# Patient Record
Sex: FEMALE | Race: ASIAN | NOT HISPANIC OR LATINO | Employment: FULL TIME | URBAN - METROPOLITAN AREA
[De-identification: names, ages, dates, MRNs, and addresses within clinical notes are randomized per-mention and may not be internally consistent; named-entity substitution may affect disease eponyms.]

---
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
## 2019-07-06 ENCOUNTER — HOSPITAL ENCOUNTER (EMERGENCY)
Facility: MEDICAL CENTER | Age: 19
End: 2019-07-06
Attending: EMERGENCY MEDICINE
Payer: COMMERCIAL

## 2019-07-06 VITALS
HEIGHT: 59 IN | WEIGHT: 90.39 LBS | HEART RATE: 98 BPM | DIASTOLIC BLOOD PRESSURE: 83 MMHG | BODY MASS INDEX: 18.22 KG/M2 | TEMPERATURE: 99 F | SYSTOLIC BLOOD PRESSURE: 118 MMHG | RESPIRATION RATE: 18 BRPM | OXYGEN SATURATION: 100 %

## 2019-07-06 DIAGNOSIS — F29 PSYCHOSIS, UNSPECIFIED PSYCHOSIS TYPE (HCC): ICD-10-CM

## 2019-07-06 LAB
AMPHETAMINES UR QL SCN: NEGATIVE
BARBITURATES UR QL SCN: NEGATIVE
BENZODIAZ UR QL SCN: NEGATIVE
COCAINE UR QL SCN: NEGATIVE
HCG UR QL: NEGATIVE
OPIATES UR QL SCN: NEGATIVE
PCP UR QL SCN: NEGATIVE
POC BREATHALIZER: 0.01 PERCENT (ref 0–0.01)
SP GR UR REFRACTOMETRY: 1.02
THC UR QL SCN: NEGATIVE

## 2019-07-06 PROCEDURE — 81025 URINE PREGNANCY TEST: CPT

## 2019-07-06 PROCEDURE — 80305 DRUG TEST PRSMV DIR OPT OBS: CPT

## 2019-07-06 PROCEDURE — 93005 ELECTROCARDIOGRAM TRACING: CPT | Performed by: EMERGENCY MEDICINE

## 2019-07-06 PROCEDURE — 302970 POC BREATHALIZER: Performed by: EMERGENCY MEDICINE

## 2019-07-06 PROCEDURE — 99285 EMERGENCY DEPT VISIT HI MDM: CPT

## 2019-07-06 RX ORDER — CHLORAL HYDRATE 500 MG
1000 CAPSULE ORAL DAILY
Status: SHIPPED | COMMUNITY
End: 2021-09-10

## 2019-07-06 RX ORDER — ASCORBIC ACID 500 MG
1500 TABLET ORAL DAILY
Status: SHIPPED | COMMUNITY
End: 2021-09-10

## 2019-07-06 RX ORDER — NORGESTIMATE AND ETHINYL ESTRADIOL 7DAYSX3 28
KIT ORAL
Status: SHIPPED | COMMUNITY
Start: 2017-05-11 | End: 2019-07-06

## 2019-07-06 RX ORDER — QUETIAPINE FUMARATE 25 MG/1
25 TABLET, FILM COATED ORAL
Status: SHIPPED | COMMUNITY
End: 2021-09-10

## 2019-07-06 NOTE — ED TRIAGE NOTES
"Chief Complaint   Patient presents with   • ALOC     pt BIB by mom c/o pt not slept in 3 days. Hearing voices that she is going to die, detached from reality. pt on seroquel currently. pt saw psychologist last year for schizophrenia. Pt in triage talking to herself softly and nodding her head. no hx of SI per mom.  pt taken to room 3.      /81   Pulse 79   Temp 37.6 °C (99.6 °F) (Temporal)   Resp 16   Ht 1.499 m (4' 11\")   Wt 41 kg (90 lb 6.2 oz)   LMP  (LMP Unknown)   SpO2 98%   BMI 18.26 kg/m²     "

## 2019-07-06 NOTE — ED NOTES
Med Rec completed per patient's mom  Allergies reviewed  No ORAL antibiotics in last 14 days    Patients mom states that last night patient took Seroquel 50 mg instead of 25 mg, but this was just a one time thing

## 2019-07-06 NOTE — ED NOTES
"Pt's mother left to purchase dinner for pt. Pt declining hospital snacks. Pt calmly laying in gurney, continues to mumble to self stating \" I told you she would, I told you. She's the one\".   "

## 2019-07-06 NOTE — DISCHARGE PLANNING
Alert Team  Received notification at 3 pm of consult order for this pt.  Pt is second on list for evaluations; will be around 5pm.  AT will call [facility] and set up Telemed appointment for eval.
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
## 2019-07-06 NOTE — ED NOTES
Pt sitting up in gurney, continues to nod head and quietly speak. Pt's mother left, father now at bedside.

## 2019-07-07 NOTE — ED NOTES
[facility] [place]. Awaiting transfer to [place]. Sitter outside room.

## 2019-07-07 NOTE — CONSULTS
"[facility]   TRIAGE ASSESSMENT done via TELEMED to [facility] ER    Name: Kate Aparicio  MRN: [id number]  : 2000  Age: 19 y.o.  Date of assessment: 2019  PCP: Ghada Olivas M.D.  Persons in attendance: Patient, Biological Mother and Biological Father    CHIEF COMPLAINT/PRESENTING ISSUE (as stated by parents): Pt was brought in by her parents d/t increasing psychosis.  Pt herself was unable to stop responding to internal stimuli (RIS) long enough to answer any of my questions.  I was able to get her attention by calling her name, but she was unable to concentrate long enough to even give me her birth date.    Pt floridly psychotic; responding frequently to internal stimuli the entire time I spoke with her parents.  Parents report they took first noticed symptoms 2018.  Pt had used juana and cocaine, then developed symptoms in the following weeks.  She started going to therapy, and while first thought to be drug induced psychosis, the therapist eventually recommended they seek psychiatric consult, as she feared pt may have schizoaffective d/o.   Per parents, the pt refused to f/u with psychiatry and was \"managing ok for a while.\"  But then she started having noticeable symptoms again, progressing to where she is today.  They report she had a \"major breakdown at work\" on , about a month ago, as well as \"four times in hysterics\" since then.  They report she hasn't slept in 3 days and is almost continuously talking to herself at home.   Chief Complaint   Patient presents with   • ALOC     pt BIB by mom c/o pt not slept in 3 days. Hearing voices that she is going to die, detached from reality. pt on seroquel currently. pt saw psychologist last year for schizophrenia. Pt in triage talking to herself softly and nodding her head. no hx of SI per mom.  pt taken to room 3.         CURRENT LIVING SITUATION/SOCIAL SUPPORT: She lives at home with parents, who are very " supportive.    BEHAVIORAL HEALTH TREATMENT HISTORY  Does patient/parent report a history of prior behavioral health treatment for patient?   Yes:    Dates Level of Care Facilty/Provider Diagnosis/Problem Medications   2018 outpt therapy                                                                      Parents report she has had no other psych tx other than therapy last year.    SAFETY ASSESSMENT - SELF  Does patient acknowledge current or past symptoms of dangerousness to self? no  Does parent/significant other report patient has current or past symptoms of dangerousness to self? no  Does presenting problem suggest symptoms of dangerousness to self? No    SAFETY ASSESSMENT - OTHERS  Does patient acknowledge current or past symptoms of aggressive behavior or risk to others? no  Does parent/significant other report patient has current or past symptoms of aggressive behavior or risk to others?  no  Does presenting problem suggest symptoms of dangerousness to others? No    Crisis Safety Plan completed and copy given to patient? N\A    ABUSE/NEGLECT SCREENING  Does patient report feeling “unsafe” in his/her home, or afraid of anyone?  FRANCESCA (unable to assess)  Does patient report any history of physical, sexual, or emotional abuse?  No, per parents  Does parent or significant other report any of the above? no  Is there evidence of neglect by self?  no  Is there evidence of neglect by a caregiver? no  Does the patient/parent report any history of CPS/APS/police involvement related to suspected abuse/neglect or domestic violence? no  Based on the information provided during the current assessment, is a mandated report of suspected abuse/neglect being made?  No    SUBSTANCE USE SCREENING  Yes:  Darius all substances used in the past 30 days:      Last Use Amount   []   Alcohol     []   Marijuana     []   Heroin     []   Prescription Opioids  (used without prescription, for    recreation, or in excess of prescribed amount)    "  []   Other Prescription  (used without prescription, for    recreation, or in excess of prescribed amount)     []   Cocaine      []   Methamphetamine     []   \"\" drugs (ectasy, MDMA)     []   Other substances        UDS results: negative  Breathalyzer results: 0.0    What consequences does the patient associate with any of the above substance use and or addictive behaviors? None    Risk factors for detox (check all that apply):  []  Seizures   []  Diaphoretic (sweating)   []  Tremors   []  Hallucinations   []  Increased blood pressure   []  Decreased blood pressure   []  Other   [x]  None      [] Patient education on risk factors for detoxification and instructed to return to ER as needed.      MENTAL STATUS   Participation: Limited verbal participation  Grooming: Casual  Orientation: Evidence of hallucinations present  Behavior: Tense  Eye contact: Poor  Mood: Anxious  Affect: Constricted  Thought process: froilan  Thought content: froilan  Speech: Pressured  Perception: Evidence of auditory hallucination. When asked if she hears voices, she                                                                                        said Yes then No, but she is obviously RIS.  Memory:  froilan  Insight: Poor  Judgment:  Poor  Other:    Collateral information: past visits, parents provided the majority of info in this eval d/t acuity of pt's symptoms.  Source:  [x] Significant other present in person:   [] Significant other by telephone  [] [facility]   [] [facility] Nursing Staff  [x] [facility]  [] Other:     [x] Unable to complete full assessment due to: assessment is complete in terms of knowing she needs inpt treatment/legal hold.   More information should be gathered from her if/when she is able to effectively communicate.  [] Acute intoxication  [] Patient declined to participate/engage  [] Patient verbally unresponsive  [] Significant cognitive deficits  [x] Significant perceptual distortions or " behavioral disorganization  [] Other:      CLINICAL IMPRESSIONS:  Primary:  Psychosis  Secondary:  Unable to care for self       IDENTIFIED NEEDS/PLAN:  [Trigger DISPOSITION list for any items marked]    []  Imminent safety risk - self [] Imminent safety risk - others   []  Acute substance withdrawal [x]  Psychosis/Impaired reality testing   []  Mood/anxiety []  Substance use/Addictive behavior   []  Maladaptive behaviro []  Parent/child conflict   []  Family/Couples conflict []  Biomedical   []  Housing []  Financial   []   Legal  Occupational/Educational   []  Domestic violence []  Other:     Disposition: Actively being addressed by Legal Hold and [facility]    Does patient express agreement with the above plan? no    Referral appointment(s) scheduled? N\A    Alert team only: Pt to be placed on legal hold for inability to care for self d/t psychosis.    I have requested that, if bedside staff at [facility] concurs with the appropriateness, that parents be allowed to stay at the bedside, despite the legal hold.  I have also asked the bedside staff call pt's mother Ibeth, ([phone],) if pt is transferred and they aren't there.  I have discussed findings and recommendations with Dr. Schaefer, who is in agreement with these recommendations.     Referral information sent to the following community providers : all    If applicable : Referred  to  at [facility] for legal hold.   Legal hold paperwork to be completed and sent for transfer by [facility] staff.      Nanci England R.N.  7/6/2019

## 2019-07-07 NOTE — ED NOTES
"Tele psych complete. Pt's parents at her side, pt remains agitated, mumbling to self in short phrases about voices she is hearing. Pt's mother tearful, tissues and emotional support provided. Pt's parents informed they may go home and rest if they wish but declined stating they \" wont be able to rest anyway\" and wish to stay with daughter.   "

## 2019-07-07 NOTE — ED NOTES
Report given to NOLBERTO. Repeat vitals taken upon transfer to [place]. VSS. Pt calm and cooperative.

## 2019-07-07 NOTE — ED NOTES
Pt very distracted but able to sign consent form with encouragement from parents. Telepsych set up at bedside.

## 2019-07-07 NOTE — ED NOTES
Pt resting calmly in gurney, unnecessary equipment removed from room. Family in room, sitter providing direct observation at doorway.

## 2019-07-14 LAB — EKG IMPRESSION: NORMAL

## 2019-09-05 ENCOUNTER — HOSPITAL ENCOUNTER (EMERGENCY)
Facility: MEDICAL CENTER | Age: 19
End: 2019-09-05
Attending: EMERGENCY MEDICINE
Payer: COMMERCIAL

## 2019-09-05 VITALS
HEART RATE: 89 BPM | WEIGHT: 98.33 LBS | RESPIRATION RATE: 18 BRPM | BODY MASS INDEX: 19.3 KG/M2 | TEMPERATURE: 98.4 F | HEIGHT: 60 IN | OXYGEN SATURATION: 99 % | SYSTOLIC BLOOD PRESSURE: 106 MMHG | DIASTOLIC BLOOD PRESSURE: 66 MMHG

## 2019-09-05 DIAGNOSIS — R25.1 TREMOR: ICD-10-CM

## 2019-09-05 LAB — EKG IMPRESSION: NORMAL

## 2019-09-05 PROCEDURE — 99283 EMERGENCY DEPT VISIT LOW MDM: CPT

## 2019-09-05 PROCEDURE — 93005 ELECTROCARDIOGRAM TRACING: CPT | Performed by: EMERGENCY MEDICINE

## 2019-09-05 RX ORDER — ZIPRASIDONE HYDROCHLORIDE 80 MG/1
80 CAPSULE ORAL DAILY
Status: SHIPPED | COMMUNITY
End: 2021-09-10

## 2019-09-05 RX ORDER — LORAZEPAM 1 MG/1
1.5 TABLET ORAL DAILY
COMMUNITY

## 2019-09-06 NOTE — ED TRIAGE NOTES
Chief Complaint   Patient presents with   • Tremors     Pt reports that she is newly diagnosed schizophrenic, just started geodon and ativan. States that today she has developed a new tremor. Sent to  for EKG,  referred pt here.   /79   Pulse 99   Temp 36.9 °C (98.4 °F) (Temporal)   Resp 20   Ht 1.524 m (5')   Wt 44.6 kg (98 lb 5.2 oz)   SpO2 96%   Pt informed of wait times. Educated on triage process.  Asked to return to triage RN for any new or worsening of symptoms. Thanked for patience.

## 2019-09-06 NOTE — ED PROVIDER NOTES
ED Provider Note    CHIEF COMPLAINT  Chief Complaint   Patient presents with   • Tremors       [facility]  Kate Aparicio is a 19 y.o. female who presents for evaluation of tremors.  The patient was recently diagnosed as having schizophrenia.  She was started on Geodon approximately 3 weeks ago.  A week ago her dose was increased and she is now developed tremors.  She saw her psychiatrist today and they decreased the dose of Geodon but she was referred to urgent care for an EKG.  Urgent care refused to see her and she is sent to the emergency department for evaluation.  She is had no fevers.  She is had no cough or sputum production.  She is had no chest pain.  She denies vomiting or diarrhea.  She states generally she is feeling much better after being started on Geodon and Ativan.  Over the past 24 hours she has had some mild intermittent shortness of breath.  She has none at this time.    REVIEW OF SYSTEMS  See [facility] for further details. All other systems negative.    PAST MEDICAL HISTORY  Past Medical History:   Diagnosis Date   • Schizophrenia ([facility])        FAMILY HISTORY  History reviewed. No pertinent family history.    SOCIAL HISTORY  Social History     Socioeconomic History   • Marital status: Single     Spouse name: Not on file   • Number of children: Not on file   • Years of education: Not on file   • Highest education level: Not on file   Occupational History   • Not on file   Social Needs   • Financial resource strain: Not on file   • Food insecurity:     Worry: Not on file     Inability: Not on file   • Transportation needs:     Medical: Not on file     Non-medical: Not on file   Tobacco Use   • Smoking status: Never Smoker   • Smokeless tobacco: Never Used   Substance and Sexual Activity   • Alcohol use: Yes   • Drug use: Never   • Sexual activity: Not on file   Lifestyle   • Physical activity:     Days per week: Not on file     Minutes per session: Not on file   • Stress: Not on file   Relationships   •  Social connections:     Talks on phone: Not on file     Gets together: Not on file     Attends [employer] service: Not on file     Active member of club or organization: Not on file     Attends meetings of clubs or organizations: Not on file     Relationship status: Not on file   • Intimate partner violence:     Fear of current or ex partner: Not on file     Emotionally abused: Not on file     Physically abused: Not on file     Forced sexual activity: Not on file   Other Topics Concern   • Not on file   Social History Narrative   • Not on file       SURGICAL HISTORY  History reviewed. No pertinent surgical history.    CURRENT MEDICATIONS  Home Medications     Reviewed by Eunice Riley R.N. (Registered Nurse) on 09/05/19 at 1716  Med List Status: Partial   Medication Last Dose Status   ascorbic acid (ASCORBIC ACID) 500 MG Tab  Active   B Complex Vitamins (VITAMIN B COMPLEX PO)  Active   CALCIUM PO  Active   LORazepam (ATIVAN) 1 MG Tab  Active   Omega-3 Fatty Acids (FISH OIL) 1000 MG Cap capsule  Active   QUEtiapine (SEROQUEL) 25 MG Tab  Active   vitamin D (CHOLECALCIFEROL) 1000 UNIT Tab  Active   ziprasidone (GEODON) 80 MG Cap  Active                ALLERGIES  No Known Allergies    PHYSICAL EXAM  VITAL SIGNS: /79   Pulse 99   Temp 36.9 °C (98.4 °F) (Temporal)   Resp 20   Ht 1.524 m (5')   Wt 44.6 kg (98 lb 5.2 oz)   SpO2 96%   BMI 19.20 kg/m²   Constitutional: Well developed, Well nourished, No acute distress, Non-toxic appearance.   HENT: Normocephalic, Atraumatic.  Eyes:  EOMI, Conjunctiva normal, No discharge.   Cardiovascular: Normal heart rate, Normal rhythm, No murmurs, No rubs, No gallops.   Thorax & Lungs: Clear to auscultation without wheezes, rales, or rhonchi.   Skin: Warm, Dry.  Extremities: No edema, no calf tenderness.  Musculoskeletal: Good range of motion in all major joints.   Neurologic: Awake and alert.  The patient does have a fine resting tremor in both hands.    EKG  EKG  Interpretation    Interpreted by emergency department physician    Rhythm: normal sinus   Rate: normal  Axis: normal  Ectopy: none  Conduction: normal  ST Segments: normal  T Waves: normal  Q Waves: none    Clinical Impression: no acute changes          COURSE & MEDICAL DECISION MAKING  Pertinent Labs & Imaging studies reviewed. (See chart for details)  This is a 19-year-old here for evaluation of tremors.  She was recently started on Geodon for a new diagnosis of schizophrenia.  She saw her psychiatrist today and they decreased her dose but requested that she get evaluated for her QTC.  EKG here shows normal sinus rhythm with a normal QTC.  I discussed the results of the EKG with the patient and her family.  She does have a mild tremor but she does not want any medications at this time.  She will decrease her Geodon dose as instructed by her psychiatrist.  She does have Ativan which I think will help with her tremors until her levels come down.  She will follow-up with her psychiatrist.    FINAL IMPRESSION  1.  Resting tremor secondary to Geodon  2.   3.         Electronically signed by: Sanchez Luis, 9/5/2019 6:03 PM

## 2019-09-06 NOTE — ED NOTES
Assumed poc for d/c. Pt and mom received d/c instr; pt and mom verbalized understanding. Pt amb to lobby w mom

## 2020-05-06 ENCOUNTER — APPOINTMENT (OUTPATIENT)
Dept: RADIOLOGY | Facility: MEDICAL CENTER | Age: 20
End: 2020-05-06
Attending: PSYCHIATRY & NEUROLOGY
Payer: COMMERCIAL

## 2020-12-22 ENCOUNTER — HOSPITAL ENCOUNTER (OUTPATIENT)
Dept: LAB | Facility: MEDICAL CENTER | Age: 20
End: 2020-12-22
Attending: PSYCHIATRY & NEUROLOGY
Payer: COMMERCIAL

## 2020-12-22 LAB
25(OH)D3 SERPL-MCNC: 26 NG/ML (ref 30–100)
ALBUMIN SERPL BCP-MCNC: 3.7 G/DL (ref 3.2–4.9)
ALBUMIN/GLOB SERPL: 1.2 G/DL
ALP SERPL-CCNC: 83 U/L (ref 30–99)
ALT SERPL-CCNC: 6 U/L (ref 2–50)
ANION GAP SERPL CALC-SCNC: 10 MMOL/L (ref 7–16)
AST SERPL-CCNC: 14 U/L (ref 12–45)
BASOPHILS # BLD AUTO: 0.4 % (ref 0–1.8)
BASOPHILS # BLD: 0.03 K/UL (ref 0–0.12)
BILIRUB SERPL-MCNC: <0.2 MG/DL (ref 0.1–1.5)
BUN SERPL-MCNC: 9 MG/DL (ref 8–22)
CALCIUM SERPL-MCNC: 9.1 MG/DL (ref 8.5–10.5)
CHLORIDE SERPL-SCNC: 103 MMOL/L (ref 96–112)
CHOLEST SERPL-MCNC: 156 MG/DL (ref 100–199)
CO2 SERPL-SCNC: 24 MMOL/L (ref 20–33)
CREAT SERPL-MCNC: 0.62 MG/DL (ref 0.5–1.4)
EOSINOPHIL # BLD AUTO: 0.17 K/UL (ref 0–0.51)
EOSINOPHIL NFR BLD: 2 % (ref 0–6.9)
ERYTHROCYTE [DISTWIDTH] IN BLOOD BY AUTOMATED COUNT: 40.8 FL (ref 35.9–50)
EST. AVERAGE GLUCOSE BLD GHB EST-MCNC: 105 MG/DL
FASTING STATUS PATIENT QL REPORTED: NORMAL
GLOBULIN SER CALC-MCNC: 3.1 G/DL (ref 1.9–3.5)
GLUCOSE SERPL-MCNC: 74 MG/DL (ref 65–99)
HBA1C MFR BLD: 5.3 % (ref 0–5.6)
HCT VFR BLD AUTO: 39.8 % (ref 37–47)
HDLC SERPL-MCNC: 60 MG/DL
HGB BLD-MCNC: 12.8 G/DL (ref 12–16)
IMM GRANULOCYTES # BLD AUTO: 0.02 K/UL (ref 0–0.11)
IMM GRANULOCYTES NFR BLD AUTO: 0.2 % (ref 0–0.9)
LDLC SERPL CALC-MCNC: 70 MG/DL
LYMPHOCYTES # BLD AUTO: 3.72 K/UL (ref 1–4.8)
LYMPHOCYTES NFR BLD: 44.3 % (ref 22–41)
MCH RBC QN AUTO: 29.6 PG (ref 27–33)
MCHC RBC AUTO-ENTMCNC: 32.2 G/DL (ref 33.6–35)
MCV RBC AUTO: 92.1 FL (ref 81.4–97.8)
MONOCYTES # BLD AUTO: 0.38 K/UL (ref 0–0.85)
MONOCYTES NFR BLD AUTO: 4.5 % (ref 0–13.4)
NEUTROPHILS # BLD AUTO: 4.08 K/UL (ref 2–7.15)
NEUTROPHILS NFR BLD: 48.6 % (ref 44–72)
NRBC # BLD AUTO: 0 K/UL
NRBC BLD-RTO: 0 /100 WBC
PLATELET # BLD AUTO: 362 K/UL (ref 164–446)
PMV BLD AUTO: 9.6 FL (ref 9–12.9)
POTASSIUM SERPL-SCNC: 4.3 MMOL/L (ref 3.6–5.5)
PROT SERPL-MCNC: 6.8 G/DL (ref 6–8.2)
RBC # BLD AUTO: 4.32 M/UL (ref 4.2–5.4)
SODIUM SERPL-SCNC: 137 MMOL/L (ref 135–145)
T4 FREE SERPL-MCNC: 0.96 NG/DL (ref 0.93–1.7)
TRIGL SERPL-MCNC: 130 MG/DL (ref 0–149)
TSH SERPL DL<=0.005 MIU/L-ACNC: 1.17 UIU/ML (ref 0.38–5.33)
WBC # BLD AUTO: 8.4 K/UL (ref 4.8–10.8)

## 2020-12-22 PROCEDURE — 80307 DRUG TEST PRSMV CHEM ANLYZR: CPT

## 2020-12-22 PROCEDURE — 36415 COLL VENOUS BLD VENIPUNCTURE: CPT

## 2020-12-22 PROCEDURE — 80053 COMPREHEN METABOLIC PANEL: CPT

## 2020-12-22 PROCEDURE — 83036 HEMOGLOBIN GLYCOSYLATED A1C: CPT

## 2020-12-22 PROCEDURE — 84439 ASSAY OF FREE THYROXINE: CPT

## 2020-12-22 PROCEDURE — 85025 COMPLETE CBC W/AUTO DIFF WBC: CPT

## 2020-12-22 PROCEDURE — 82306 VITAMIN D 25 HYDROXY: CPT

## 2020-12-22 PROCEDURE — 80061 LIPID PANEL: CPT

## 2020-12-22 PROCEDURE — 84443 ASSAY THYROID STIM HORMONE: CPT

## 2020-12-24 LAB
AMPHET CTO UR CFM-MCNC: NEGATIVE NG/ML
BARBITURATES CTO UR CFM-MCNC: NEGATIVE NG/ML
BENZODIAZ CTO UR CFM-MCNC: NEGATIVE NG/ML
CANNABINOIDS CTO UR CFM-MCNC: NEGATIVE NG/ML
COCAINE CTO UR CFM-MCNC: NEGATIVE NG/ML
DRUG COMMENT 753798: NORMAL
METHADONE CTO UR CFM-MCNC: NEGATIVE NG/ML
OPIATES CTO UR CFM-MCNC: NEGATIVE NG/ML
PCP CTO UR CFM-MCNC: NEGATIVE NG/ML
PROPOXYPH CTO UR CFM-MCNC: NEGATIVE NG/ML

## 2021-08-31 ENCOUNTER — OFFICE VISIT (OUTPATIENT)
Dept: URGENT CARE | Facility: PHYSICIAN GROUP | Age: 21
End: 2021-08-31
Payer: COMMERCIAL

## 2021-08-31 VITALS
RESPIRATION RATE: 16 BRPM | DIASTOLIC BLOOD PRESSURE: 68 MMHG | HEIGHT: 60 IN | SYSTOLIC BLOOD PRESSURE: 110 MMHG | WEIGHT: 153.6 LBS | BODY MASS INDEX: 30.15 KG/M2 | HEART RATE: 96 BPM | OXYGEN SATURATION: 97 % | TEMPERATURE: 98.4 F

## 2021-08-31 DIAGNOSIS — N30.01 ACUTE CYSTITIS WITH HEMATURIA: ICD-10-CM

## 2021-08-31 DIAGNOSIS — R30.0 DYSURIA: ICD-10-CM

## 2021-08-31 LAB
APPEARANCE UR: NORMAL
BILIRUB UR STRIP-MCNC: NORMAL MG/DL
COLOR UR AUTO: NORMAL
GLUCOSE UR STRIP.AUTO-MCNC: NORMAL MG/DL
INT CON NEG: NORMAL
INT CON POS: NORMAL
KETONES UR STRIP.AUTO-MCNC: NORMAL MG/DL
LEUKOCYTE ESTERASE UR QL STRIP.AUTO: NORMAL
NITRITE UR QL STRIP.AUTO: NORMAL
PH UR STRIP.AUTO: 6 [PH] (ref 5–8)
POC URINE PREGNANCY TEST: NEGATIVE
PROT UR QL STRIP: 100 MG/DL
RBC UR QL AUTO: NORMAL
SP GR UR STRIP.AUTO: 1.03
UROBILINOGEN UR STRIP-MCNC: 0.2 MG/DL

## 2021-08-31 PROCEDURE — 99203 OFFICE O/P NEW LOW 30 MIN: CPT | Performed by: NURSE PRACTITIONER

## 2021-08-31 PROCEDURE — 81025 URINE PREGNANCY TEST: CPT | Performed by: NURSE PRACTITIONER

## 2021-08-31 PROCEDURE — 81002 URINALYSIS NONAUTO W/O SCOPE: CPT | Performed by: NURSE PRACTITIONER

## 2021-08-31 RX ORDER — BENZTROPINE MESYLATE 1 MG/1
1 TABLET ORAL
COMMUNITY
Start: 2021-07-14

## 2021-08-31 RX ORDER — OLANZAPINE 5 MG/1
TABLET ORAL
COMMUNITY
Start: 2021-08-17

## 2021-08-31 RX ORDER — NORGESTIMATE AND ETHINYL ESTRADIOL 7DAYSX3 LO
1 KIT ORAL
COMMUNITY
Start: 2021-07-16

## 2021-08-31 RX ORDER — CEFDINIR 300 MG/1
300 CAPSULE ORAL EVERY 12 HOURS
Qty: 10 CAPSULE | Refills: 0 | Status: SHIPPED | OUTPATIENT
Start: 2021-08-31 | End: 2021-09-05

## 2021-08-31 ASSESSMENT — ENCOUNTER SYMPTOMS
BACK PAIN: 0
VOMITING: 0
DIARRHEA: 0
CHILLS: 0
HEADACHES: 0
WEAKNESS: 0
ABDOMINAL PAIN: 0
CONSTIPATION: 0
FEVER: 0
DIZZINESS: 0
NAUSEA: 0
MYALGIAS: 0

## 2021-08-31 ASSESSMENT — FIBROSIS 4 INDEX: FIB4 SCORE: 0.35

## 2021-08-31 NOTE — PROGRESS NOTES
"Inez Aparicio is a 21 y.o. female who presents with Urinary Frequency (painful urination, burning, x1 week )            HPI  States dysuria, urgency, frequency x 1 week. Denies hematuria. LMP 8/9/21. \"normal\". Sexually active, uses birth control./ Denies fever, nausea/vomiting, mild low pelvic pressure, no back pain, no vaginal discharge.     PMH:  has a past medical history of Schizophrenia ([facility]).  MEDS:   Current Outpatient Medications:   •  benztropine (COGENTIN) 1 MG Tab, Take 1 mg by mouth every day., Disp: , Rfl:   •  OLANZapine (ZYPREXA) 5 MG Tab, TAKE 1 TABLET (5 MG) BY MOUTH EVERY NIGHT, Disp: , Rfl:   •  LORazepam (ATIVAN) 1 MG Tab, Take 1.5 mg by mouth every day., Disp: , Rfl:   •  TRI-LO-ESTARYLLA 0.18/0.215/0.25 MG-25 MCG Tab, Take 1 Tablet by mouth every day. (Patient not taking: Reported on 8/31/2021), Disp: , Rfl:   •  ziprasidone (GEODON) 80 MG Cap, Take 80 mg by mouth every day. (Patient not taking: Reported on 8/31/2021), Disp: , Rfl:   •  QUEtiapine (SEROQUEL) 25 MG Tab, Take 25 mg by mouth every bedtime. (Patient not taking: Reported on 8/31/2021), Disp: , Rfl:   •  Omega-3 Fatty Acids (FISH OIL) 1000 MG Cap capsule, Take 1,000 mg by mouth every day. (Patient not taking: Reported on 8/31/2021), Disp: , Rfl:   •  CALCIUM PO, Take 1 Tab by mouth every day. (Patient not taking: Reported on 8/31/2021), Disp: , Rfl:   •  ascorbic acid (ASCORBIC ACID) 500 MG Tab, Take 1,500 mg by mouth every day. (Patient not taking: Reported on 8/31/2021), Disp: , Rfl:   •  vitamin D (CHOLECALCIFEROL) 1000 UNIT Tab, Take 1,000 Units by mouth every day. (Patient not taking: Reported on 8/31/2021), Disp: , Rfl:   •  B Complex Vitamins (VITAMIN B COMPLEX PO), Take 1 Tab by mouth every day. (Patient not taking: Reported on 8/31/2021), Disp: , Rfl:   ALLERGIES: No Known Allergies  SURGHX: History reviewed. No pertinent surgical history.  SOCHX:  reports that she has never smoked. She has never used " smokeless tobacco. She reports current alcohol use. She reports that she does not use drugs.  FH: Family history was reviewed, no pertinent findings to report    Review of Systems   Constitutional: Negative for chills, fever and malaise/fatigue.   Gastrointestinal: Negative for abdominal pain, constipation, diarrhea, nausea and vomiting.   Genitourinary: Positive for dysuria, frequency and urgency.   Musculoskeletal: Negative for back pain and myalgias.   Skin: Negative for itching and rash.   Neurological: Negative for dizziness, weakness and headaches.   Endo/Heme/Allergies: Negative for environmental allergies.   All other systems reviewed and are negative.             Objective     /68 (BP Location: Right arm, Patient Position: Sitting, BP Cuff Size: Large adult)   Pulse 96   Temp 36.9 °C (98.4 °F) (Temporal)   Resp 16   Ht 1.524 m (5')   Wt 69.7 kg (153 lb 9.6 oz)   SpO2 97%   BMI 30.00 kg/m²      Physical Exam  Vitals reviewed.   Constitutional:       General: She is awake. She is not in acute distress.     Appearance: Normal appearance. She is well-developed. She is not ill-appearing, toxic-appearing or diaphoretic.   HENT:      Head: Normocephalic.   Eyes:      Conjunctiva/sclera: Conjunctivae normal.      Pupils: Pupils are equal, round, and reactive to light.   Cardiovascular:      Rate and Rhythm: Normal rate.   Pulmonary:      Effort: Pulmonary effort is normal.   Abdominal:      General: Bowel sounds are normal. There is no distension.      Palpations: Abdomen is soft.      Tenderness: There is no abdominal tenderness. There is no right CVA tenderness, left CVA tenderness, guarding or rebound.   Musculoskeletal:         General: Normal range of motion.      Cervical back: Normal range of motion and neck supple.   Skin:     General: Skin is warm and dry.   Neurological:      Mental Status: She is alert and oriented to person, place, and time.   Psychiatric:         Behavior: Behavior is  cooperative.                             Assessment & Plan        1. Acute cystitis with hematuria    - cefdinir (OMNICEF) 300 MG Cap; Take 1 Capsule by mouth every 12 hours for 5 days.  Dispense: 10 Capsule; Refill: 0    2. Dysuria    - POCT Urinalysis  - POCT PREGNANCY     -Increase water intake  -Urinate more frequently and empty bladder completely  -Practice good toileting hygiene after bowel movements and sexual intercourse, refrain from sexual intercourse until infection cleared  -Monitor for back/flank pain, difficulty urinating, blood in urine- need re-evaluation

## 2021-09-10 ENCOUNTER — OFFICE VISIT (OUTPATIENT)
Dept: URGENT CARE | Facility: PHYSICIAN GROUP | Age: 21
End: 2021-09-10
Payer: COMMERCIAL

## 2021-09-10 ENCOUNTER — HOSPITAL ENCOUNTER (OUTPATIENT)
Facility: MEDICAL CENTER | Age: 21
End: 2021-09-10
Attending: PHYSICIAN ASSISTANT
Payer: COMMERCIAL

## 2021-09-10 VITALS
HEART RATE: 86 BPM | RESPIRATION RATE: 16 BRPM | WEIGHT: 152 LBS | TEMPERATURE: 97.6 F | OXYGEN SATURATION: 97 % | BODY MASS INDEX: 29.84 KG/M2 | SYSTOLIC BLOOD PRESSURE: 104 MMHG | DIASTOLIC BLOOD PRESSURE: 70 MMHG | HEIGHT: 60 IN

## 2021-09-10 DIAGNOSIS — N76.0 ACUTE VAGINITIS: ICD-10-CM

## 2021-09-10 DIAGNOSIS — R21 RASH OF GENITAL AREA: ICD-10-CM

## 2021-09-10 PROCEDURE — 87510 GARDNER VAG DNA DIR PROBE: CPT

## 2021-09-10 PROCEDURE — 87480 CANDIDA DNA DIR PROBE: CPT

## 2021-09-10 PROCEDURE — 87660 TRICHOMONAS VAGIN DIR PROBE: CPT

## 2021-09-10 PROCEDURE — 99213 OFFICE O/P EST LOW 20 MIN: CPT | Performed by: PHYSICIAN ASSISTANT

## 2021-09-10 RX ORDER — FLUCONAZOLE 150 MG/1
TABLET ORAL
Qty: 2 TABLET | Refills: 0 | Status: SHIPPED | OUTPATIENT
Start: 2021-09-10

## 2021-09-10 RX ORDER — KETOCONAZOLE 20 MG/G
CREAM TOPICAL
Qty: 30 G | Refills: 1 | Status: SHIPPED | OUTPATIENT
Start: 2021-09-10

## 2021-09-10 ASSESSMENT — ENCOUNTER SYMPTOMS
NAUSEA: 0
FLANK PAIN: 0
FEVER: 0
COUGH: 0
ABDOMINAL PAIN: 0
VOMITING: 0
CHILLS: 0

## 2021-09-10 ASSESSMENT — FIBROSIS 4 INDEX: FIB4 SCORE: 0.35

## 2021-09-10 NOTE — PROGRESS NOTES
Subjective:   Kate Aparicio  is a 21 y.o. female who presents for Rash (pt was taking antibiotics for UTI after the antibiotics she noticed that she start developing a rash between her legs,x5 days.)      Rash  This is a new problem. The current episode started in the past 7 days. Pertinent negatives include no cough, fever or vomiting.     Patient presents urgent care out of concern for yeast infection.  She states she did finish a urinary antibiotic around 5 days ago.  She was warned at that time she may develop to yeast infection thereafter.  She denies dysuria frequency urgency or hematuria.  She notes complete resolution of symptoms of urinary tract infection.  She denies chance of pregnancy stating states she is currently on her menstrual period.  This is normal timing for her menstrual period.  She denies any abnormal discharge.  She denies concern for STI.  She states she has a slight pruritic rash in her groin crease and on either side of vagina.  She has tried no treatments thus far.    Review of Systems   Constitutional: Negative for chills and fever.   Respiratory: Negative for cough.    Gastrointestinal: Negative for abdominal pain, nausea and vomiting.   Genitourinary: Negative for dysuria, flank pain, frequency, hematuria and urgency.   Skin: Positive for itching and rash.       No Known Allergies     Objective:   /70 (BP Location: Right arm, Patient Position: Sitting, BP Cuff Size: Adult)   Pulse 86   Temp 36.4 °C (97.6 °F) (Temporal)   Resp 16   Ht 1.524 m (5')   Wt 68.9 kg (152 lb)   SpO2 97%   BMI 29.69 kg/m²     Physical Exam  Vitals and nursing note reviewed.   Constitutional:       General: She is not in acute distress.     Appearance: She is well-developed. She is not diaphoretic.   HENT:      Head: Normocephalic and atraumatic.      Right Ear: External ear normal.      Left Ear: External ear normal.      Nose: Nose normal.   Eyes:      General: Lids are normal. No scleral  icterus.        Right eye: No discharge.         Left eye: No discharge.      Conjunctiva/sclera: Conjunctivae normal.   Pulmonary:      Effort: Pulmonary effort is normal. No respiratory distress.   Genitourinary:     Comments: Patient opts to decline pelvic/genital exam for self swab  Musculoskeletal:         General: Normal range of motion.      Cervical back: Neck supple.   Skin:     General: Skin is warm and dry.      Coloration: Skin is not pale.      Findings: No erythema.   Neurological:      Mental Status: She is alert and oriented to person, place, and time. She is not disoriented.   Psychiatric:         Speech: Speech normal.         Behavior: Behavior normal.       Vaginal pathogens swab pending    Assessment/Plan:   1. Acute vaginitis  - VAGINAL PATHOGENS DNA PANEL; Future  - fluconazole (DIFLUCAN) 150 MG tablet; Take one tab PO day #1, wait 4 days, if still w/ s/sx take 2nd tab PO  Dispense: 2 Tablet; Refill: 0    2. Rash of genital area  - ketoconazole (NIZORAL) 2 % Cream; Apply to affected area twice daily; continue treatment for approximately 2 weeks  Dispense: 30 g; Refill: 1  -Sent with topical antifungal, continue for approximately 2-week and treatment  -Covered with Diflucan x2 doses, will check vaginal pathogens  -Encouraged to sign up for my chart for results  -Return to clinic with lack of resolution or progression of symptoms.       I have worn an N95 mask, gloves and eye protection for the entire encounter with this patient.     Differential diagnosis, natural history, supportive care, and indications for immediate follow-up discussed.

## 2021-09-11 DIAGNOSIS — N76.0 ACUTE VAGINITIS: ICD-10-CM

## 2021-09-11 LAB
CANDIDA DNA VAG QL PROBE+SIG AMP: NEGATIVE
G VAGINALIS DNA VAG QL PROBE+SIG AMP: POSITIVE
T VAGINALIS DNA VAG QL PROBE+SIG AMP: NEGATIVE

## 2021-09-14 ENCOUNTER — TELEPHONE (OUTPATIENT)
Dept: URGENT CARE | Facility: PHYSICIAN GROUP | Age: 21
End: 2021-09-14

## 2021-09-14 DIAGNOSIS — B96.89 BV (BACTERIAL VAGINOSIS): ICD-10-CM

## 2021-09-14 DIAGNOSIS — N76.0 BV (BACTERIAL VAGINOSIS): ICD-10-CM

## 2021-09-14 RX ORDER — METRONIDAZOLE 500 MG/1
500 TABLET ORAL 2 TIMES DAILY
Qty: 14 TABLET | Refills: 0 | Status: SHIPPED | OUTPATIENT
Start: 2021-09-14 | End: 2021-09-21

## 2021-09-14 NOTE — PROGRESS NOTES
1. BV (bacterial vaginosis)    - metroNIDAZOLE (FLAGYL) 500 MG Tab; Take 1 Tablet by mouth 2 times a day for 7 days.  Dispense: 14 Tablet; Refill: 0

## 2021-09-15 NOTE — TELEPHONE ENCOUNTER
Pt called because she got a voicemail her results were in.  I gave the noted results from Sacha on the labs.

## 2022-01-25 ENCOUNTER — HOSPITAL ENCOUNTER (OUTPATIENT)
Dept: LAB | Facility: MEDICAL CENTER | Age: 22
End: 2022-01-25
Attending: PSYCHIATRY & NEUROLOGY
Payer: COMMERCIAL

## 2022-01-25 LAB
25(OH)D3 SERPL-MCNC: 24 NG/ML (ref 30–100)
ALBUMIN SERPL BCP-MCNC: 3.8 G/DL (ref 3.2–4.9)
ALBUMIN/GLOB SERPL: 1.2 G/DL
ALP SERPL-CCNC: 83 U/L (ref 30–99)
ALT SERPL-CCNC: 10 U/L (ref 2–50)
ANION GAP SERPL CALC-SCNC: 12 MMOL/L (ref 7–16)
AST SERPL-CCNC: 18 U/L (ref 12–45)
BASOPHILS # BLD AUTO: 0.3 % (ref 0–1.8)
BASOPHILS # BLD: 0.02 K/UL (ref 0–0.12)
BILIRUB SERPL-MCNC: 0.2 MG/DL (ref 0.1–1.5)
BUN SERPL-MCNC: 10 MG/DL (ref 8–22)
CALCIUM SERPL-MCNC: 8.9 MG/DL (ref 8.5–10.5)
CHLORIDE SERPL-SCNC: 105 MMOL/L (ref 96–112)
CHOLEST SERPL-MCNC: 161 MG/DL (ref 100–199)
CO2 SERPL-SCNC: 21 MMOL/L (ref 20–33)
CREAT SERPL-MCNC: 0.75 MG/DL (ref 0.5–1.4)
EOSINOPHIL # BLD AUTO: 0.08 K/UL (ref 0–0.51)
EOSINOPHIL NFR BLD: 1 % (ref 0–6.9)
ERYTHROCYTE [DISTWIDTH] IN BLOOD BY AUTOMATED COUNT: 41.9 FL (ref 35.9–50)
EST. AVERAGE GLUCOSE BLD GHB EST-MCNC: 105 MG/DL
FASTING STATUS PATIENT QL REPORTED: NORMAL
GLOBULIN SER CALC-MCNC: 3.2 G/DL (ref 1.9–3.5)
GLUCOSE SERPL-MCNC: 79 MG/DL (ref 65–99)
HBA1C MFR BLD: 5.3 % (ref 4–5.6)
HCT VFR BLD AUTO: 38.5 % (ref 37–47)
HDLC SERPL-MCNC: 56 MG/DL
HGB BLD-MCNC: 12.4 G/DL (ref 12–16)
IMM GRANULOCYTES # BLD AUTO: 0.02 K/UL (ref 0–0.11)
IMM GRANULOCYTES NFR BLD AUTO: 0.3 % (ref 0–0.9)
LDLC SERPL CALC-MCNC: 82 MG/DL
LYMPHOCYTES # BLD AUTO: 4.2 K/UL (ref 1–4.8)
LYMPHOCYTES NFR BLD: 53.4 % (ref 22–41)
MCH RBC QN AUTO: 29.7 PG (ref 27–33)
MCHC RBC AUTO-ENTMCNC: 32.2 G/DL (ref 33.6–35)
MCV RBC AUTO: 92.3 FL (ref 81.4–97.8)
MONOCYTES # BLD AUTO: 0.38 K/UL (ref 0–0.85)
MONOCYTES NFR BLD AUTO: 4.8 % (ref 0–13.4)
NEUTROPHILS # BLD AUTO: 3.17 K/UL (ref 2–7.15)
NEUTROPHILS NFR BLD: 40.2 % (ref 44–72)
NRBC # BLD AUTO: 0 K/UL
NRBC BLD-RTO: 0 /100 WBC
PLATELET # BLD AUTO: 351 K/UL (ref 164–446)
PMV BLD AUTO: 10 FL (ref 9–12.9)
POTASSIUM SERPL-SCNC: 4 MMOL/L (ref 3.6–5.5)
PROT SERPL-MCNC: 7 G/DL (ref 6–8.2)
RBC # BLD AUTO: 4.17 M/UL (ref 4.2–5.4)
SODIUM SERPL-SCNC: 138 MMOL/L (ref 135–145)
T4 FREE SERPL-MCNC: 1.11 NG/DL (ref 0.93–1.7)
TRIGL SERPL-MCNC: 114 MG/DL (ref 0–149)
TSH SERPL DL<=0.005 MIU/L-ACNC: 0.66 UIU/ML (ref 0.38–5.33)
WBC # BLD AUTO: 7.9 K/UL (ref 4.8–10.8)

## 2022-01-25 PROCEDURE — 80053 COMPREHEN METABOLIC PANEL: CPT

## 2022-01-25 PROCEDURE — 84443 ASSAY THYROID STIM HORMONE: CPT

## 2022-01-25 PROCEDURE — 82306 VITAMIN D 25 HYDROXY: CPT

## 2022-01-25 PROCEDURE — 84439 ASSAY OF FREE THYROXINE: CPT

## 2022-01-25 PROCEDURE — 85025 COMPLETE CBC W/AUTO DIFF WBC: CPT

## 2022-01-25 PROCEDURE — 83036 HEMOGLOBIN GLYCOSYLATED A1C: CPT

## 2022-01-25 PROCEDURE — 80307 DRUG TEST PRSMV CHEM ANLYZR: CPT

## 2022-01-25 PROCEDURE — 80061 LIPID PANEL: CPT

## 2022-01-25 PROCEDURE — 36415 COLL VENOUS BLD VENIPUNCTURE: CPT

## 2022-01-27 LAB
AMPHETAMINES SERPL QL SCN: NEGATIVE NG/ML
ANNOTATION COMMENT IMP: NORMAL
BARBITURATES SERPL QL SCN: NEGATIVE NG/ML
BENZODIAZ SERPL QL SCN: NEGATIVE NG/ML
BUPRENORPHINE SERPL-MCNC: NEGATIVE NG/ML
CANNABINOIDS SERPL QL SCN: NEGATIVE NG/ML
COCAINE SERPL QL SCN: NEGATIVE NG/ML
METHADONE SERPL QL SCN: NEGATIVE NG/ML
METHAMPHET SERPL QL: NEGATIVE NG/ML
OPIATES SERPL QL SCN: NEGATIVE NG/ML
OXYCODONE SERPL QL: NEGATIVE NG/ML
PCP SERPL QL SCN: NEGATIVE NG/ML

## 2024-12-10 ENCOUNTER — HOSPITAL ENCOUNTER (OUTPATIENT)
Dept: LAB | Facility: MEDICAL CENTER | Age: 24
End: 2024-12-10
Attending: PSYCHIATRY & NEUROLOGY
Payer: COMMERCIAL

## 2024-12-10 LAB
BASOPHILS # BLD AUTO: 0.3 % (ref 0–1.8)
BASOPHILS # BLD: 0.02 K/UL (ref 0–0.12)
EOSINOPHIL # BLD AUTO: 0.06 K/UL (ref 0–0.51)
EOSINOPHIL NFR BLD: 0.9 % (ref 0–6.9)
ERYTHROCYTE [DISTWIDTH] IN BLOOD BY AUTOMATED COUNT: 41.5 FL (ref 35.9–50)
EST. AVERAGE GLUCOSE BLD GHB EST-MCNC: 120 MG/DL
HBA1C MFR BLD: 5.8 % (ref 4–5.6)
HCT VFR BLD AUTO: 40.8 % (ref 37–47)
HGB BLD-MCNC: 13.4 G/DL (ref 12–16)
IMM GRANULOCYTES # BLD AUTO: 0.02 K/UL (ref 0–0.11)
IMM GRANULOCYTES NFR BLD AUTO: 0.3 % (ref 0–0.9)
LYMPHOCYTES # BLD AUTO: 2.7 K/UL (ref 1–4.8)
LYMPHOCYTES NFR BLD: 38.4 % (ref 22–41)
MCH RBC QN AUTO: 29.3 PG (ref 27–33)
MCHC RBC AUTO-ENTMCNC: 32.8 G/DL (ref 32.2–35.5)
MCV RBC AUTO: 89.3 FL (ref 81.4–97.8)
MONOCYTES # BLD AUTO: 0.41 K/UL (ref 0–0.85)
MONOCYTES NFR BLD AUTO: 5.8 % (ref 0–13.4)
NEUTROPHILS # BLD AUTO: 3.83 K/UL (ref 1.82–7.42)
NEUTROPHILS NFR BLD: 54.3 % (ref 44–72)
NRBC # BLD AUTO: 0 K/UL
NRBC BLD-RTO: 0 /100 WBC (ref 0–0.2)
PLATELET # BLD AUTO: 345 K/UL (ref 164–446)
PMV BLD AUTO: 10.3 FL (ref 9–12.9)
RBC # BLD AUTO: 4.57 M/UL (ref 4.2–5.4)
WBC # BLD AUTO: 7 K/UL (ref 4.8–10.8)

## 2024-12-10 PROCEDURE — 84443 ASSAY THYROID STIM HORMONE: CPT

## 2024-12-10 PROCEDURE — 80061 LIPID PANEL: CPT

## 2024-12-10 PROCEDURE — 84439 ASSAY OF FREE THYROXINE: CPT

## 2024-12-10 PROCEDURE — 84402 ASSAY OF FREE TESTOSTERONE: CPT

## 2024-12-10 PROCEDURE — 82306 VITAMIN D 25 HYDROXY: CPT

## 2024-12-10 PROCEDURE — 80053 COMPREHEN METABOLIC PANEL: CPT

## 2024-12-10 PROCEDURE — 83036 HEMOGLOBIN GLYCOSYLATED A1C: CPT

## 2024-12-10 PROCEDURE — 84481 FREE ASSAY (FT-3): CPT

## 2024-12-10 PROCEDURE — 82746 ASSAY OF FOLIC ACID SERUM: CPT

## 2024-12-10 PROCEDURE — 82607 VITAMIN B-12: CPT

## 2024-12-10 PROCEDURE — 86376 MICROSOMAL ANTIBODY EACH: CPT

## 2024-12-10 PROCEDURE — 85025 COMPLETE CBC W/AUTO DIFF WBC: CPT

## 2024-12-10 PROCEDURE — 36415 COLL VENOUS BLD VENIPUNCTURE: CPT

## 2024-12-10 PROCEDURE — 84270 ASSAY OF SEX HORMONE GLOBUL: CPT

## 2024-12-10 PROCEDURE — 86800 THYROGLOBULIN ANTIBODY: CPT

## 2024-12-10 PROCEDURE — 84403 ASSAY OF TOTAL TESTOSTERONE: CPT

## 2024-12-11 LAB
25(OH)D3 SERPL-MCNC: 51 NG/ML (ref 30–100)
ALBUMIN SERPL BCP-MCNC: 4.7 G/DL (ref 3.2–4.9)
ALBUMIN/GLOB SERPL: 1.5 G/DL
ALP SERPL-CCNC: 72 U/L (ref 30–99)
ALT SERPL-CCNC: 14 U/L (ref 2–50)
ANION GAP SERPL CALC-SCNC: 14 MMOL/L (ref 7–16)
AST SERPL-CCNC: 15 U/L (ref 12–45)
BILIRUB SERPL-MCNC: 0.4 MG/DL (ref 0.1–1.5)
BUN SERPL-MCNC: 9 MG/DL (ref 8–22)
CALCIUM ALBUM COR SERPL-MCNC: 8.9 MG/DL (ref 8.5–10.5)
CALCIUM SERPL-MCNC: 9.5 MG/DL (ref 8.5–10.5)
CHLORIDE SERPL-SCNC: 100 MMOL/L (ref 96–112)
CHOLEST SERPL-MCNC: 168 MG/DL (ref 100–199)
CO2 SERPL-SCNC: 24 MMOL/L (ref 20–33)
CREAT SERPL-MCNC: 0.62 MG/DL (ref 0.5–1.4)
FASTING STATUS PATIENT QL REPORTED: NORMAL
FOLATE SERPL-MCNC: 7 NG/ML
GFR SERPLBLD CREATININE-BSD FMLA CKD-EPI: 126 ML/MIN/1.73 M 2
GLOBULIN SER CALC-MCNC: 3.1 G/DL (ref 1.9–3.5)
GLUCOSE SERPL-MCNC: 80 MG/DL (ref 65–99)
HDLC SERPL-MCNC: 64 MG/DL
LDLC SERPL CALC-MCNC: 92 MG/DL
POTASSIUM SERPL-SCNC: 3.8 MMOL/L (ref 3.6–5.5)
PROT SERPL-MCNC: 7.8 G/DL (ref 6–8.2)
SODIUM SERPL-SCNC: 138 MMOL/L (ref 135–145)
T3FREE SERPL-MCNC: 3.18 PG/ML (ref 2–4.4)
T4 FREE SERPL-MCNC: 1.32 NG/DL (ref 0.93–1.7)
THYROPEROXIDASE AB SERPL-ACNC: 14 IU/ML (ref 0–9)
TRIGL SERPL-MCNC: 58 MG/DL (ref 0–149)
TSH SERPL-ACNC: 0.46 UIU/ML (ref 0.35–5.5)
VIT B12 SERPL-MCNC: 1408 PG/ML (ref 211–911)

## 2024-12-12 LAB — THYROGLOB AB SERPL-ACNC: <0.9 IU/ML (ref 0–4)

## 2024-12-15 LAB
SHBG SERPL-SCNC: 49 NMOL/L (ref 25–122)
TESTOST FREE SERPL-MCNC: 6.6 PG/ML (ref 0.8–7.4)
TESTOST SERPL-MCNC: 50 NG/DL (ref 9–55)

## 2025-04-28 ENCOUNTER — APPOINTMENT (OUTPATIENT)
Dept: MEDICAL GROUP | Facility: MEDICAL CENTER | Age: 25
End: 2025-04-28
Payer: COMMERCIAL

## 2025-05-28 ENCOUNTER — APPOINTMENT (OUTPATIENT)
Dept: MEDICAL GROUP | Facility: PHYSICIAN GROUP | Age: 25
End: 2025-05-28
Payer: COMMERCIAL

## 2025-06-20 ENCOUNTER — HOSPITAL ENCOUNTER (OUTPATIENT)
Dept: LAB | Facility: MEDICAL CENTER | Age: 25
End: 2025-06-20
Attending: PSYCHIATRY & NEUROLOGY
Payer: COMMERCIAL

## 2025-06-20 LAB
ALBUMIN SERPL BCP-MCNC: 4.2 G/DL (ref 3.2–4.9)
ALBUMIN/GLOB SERPL: 1.4 G/DL
ALP SERPL-CCNC: 75 U/L (ref 30–99)
ALT SERPL-CCNC: 9 U/L (ref 2–50)
ANION GAP SERPL CALC-SCNC: 12 MMOL/L (ref 7–16)
AST SERPL-CCNC: 17 U/L (ref 12–45)
BASOPHILS # BLD AUTO: 0.4 % (ref 0–1.8)
BASOPHILS # BLD: 0.03 K/UL (ref 0–0.12)
BILIRUB SERPL-MCNC: 0.4 MG/DL (ref 0.1–1.5)
BUN SERPL-MCNC: 8 MG/DL (ref 8–22)
CALCIUM ALBUM COR SERPL-MCNC: 9.1 MG/DL (ref 8.5–10.5)
CALCIUM SERPL-MCNC: 9.3 MG/DL (ref 8.5–10.5)
CHLORIDE SERPL-SCNC: 103 MMOL/L (ref 96–112)
CO2 SERPL-SCNC: 23 MMOL/L (ref 20–33)
CREAT SERPL-MCNC: 0.68 MG/DL (ref 0.5–1.4)
EOSINOPHIL # BLD AUTO: 0.08 K/UL (ref 0–0.51)
EOSINOPHIL NFR BLD: 1.1 % (ref 0–6.9)
ERYTHROCYTE [DISTWIDTH] IN BLOOD BY AUTOMATED COUNT: 40.7 FL (ref 35.9–50)
EST. AVERAGE GLUCOSE BLD GHB EST-MCNC: 103 MG/DL
FASTING STATUS PATIENT QL REPORTED: NORMAL
GFR SERPLBLD CREATININE-BSD FMLA CKD-EPI: 123 ML/MIN/1.73 M 2
GLOBULIN SER CALC-MCNC: 2.9 G/DL (ref 1.9–3.5)
GLUCOSE SERPL-MCNC: 83 MG/DL (ref 65–99)
HBA1C MFR BLD: 5.2 % (ref 4–5.6)
HCT VFR BLD AUTO: 40.8 % (ref 37–47)
HGB BLD-MCNC: 13.3 G/DL (ref 12–16)
IMM GRANULOCYTES # BLD AUTO: 0.01 K/UL (ref 0–0.11)
IMM GRANULOCYTES NFR BLD AUTO: 0.1 % (ref 0–0.9)
LYMPHOCYTES # BLD AUTO: 4.38 K/UL (ref 1–4.8)
LYMPHOCYTES NFR BLD: 60.6 % (ref 22–41)
MCH RBC QN AUTO: 30 PG (ref 27–33)
MCHC RBC AUTO-ENTMCNC: 32.6 G/DL (ref 32.2–35.5)
MCV RBC AUTO: 91.9 FL (ref 81.4–97.8)
MONOCYTES # BLD AUTO: 0.33 K/UL (ref 0–0.85)
MONOCYTES NFR BLD AUTO: 4.6 % (ref 0–13.4)
NEUTROPHILS # BLD AUTO: 2.4 K/UL (ref 1.82–7.42)
NEUTROPHILS NFR BLD: 33.2 % (ref 44–72)
NRBC # BLD AUTO: 0 K/UL
NRBC BLD-RTO: 0 /100 WBC (ref 0–0.2)
PLATELET # BLD AUTO: 383 K/UL (ref 164–446)
PMV BLD AUTO: 9.7 FL (ref 9–12.9)
POTASSIUM SERPL-SCNC: 4.3 MMOL/L (ref 3.6–5.5)
PROT SERPL-MCNC: 7.1 G/DL (ref 6–8.2)
RBC # BLD AUTO: 4.44 M/UL (ref 4.2–5.4)
SODIUM SERPL-SCNC: 138 MMOL/L (ref 135–145)
T3FREE SERPL-MCNC: 2.98 PG/ML (ref 2–4.4)
T4 FREE SERPL-MCNC: 1.33 NG/DL (ref 0.93–1.7)
THYROPEROXIDASE AB SERPL-ACNC: 12.9 IU/ML (ref 0–9)
TSH SERPL-ACNC: 0.77 UIU/ML (ref 0.38–5.33)
VIT B12 SERPL-MCNC: 694 PG/ML (ref 211–911)
WBC # BLD AUTO: 7.2 K/UL (ref 4.8–10.8)

## 2025-06-20 PROCEDURE — 84439 ASSAY OF FREE THYROXINE: CPT

## 2025-06-20 PROCEDURE — 82607 VITAMIN B-12: CPT

## 2025-06-20 PROCEDURE — 85025 COMPLETE CBC W/AUTO DIFF WBC: CPT

## 2025-06-20 PROCEDURE — 86800 THYROGLOBULIN ANTIBODY: CPT

## 2025-06-20 PROCEDURE — 86376 MICROSOMAL ANTIBODY EACH: CPT

## 2025-06-20 PROCEDURE — 84443 ASSAY THYROID STIM HORMONE: CPT

## 2025-06-20 PROCEDURE — 84481 FREE ASSAY (FT-3): CPT

## 2025-06-20 PROCEDURE — 83036 HEMOGLOBIN GLYCOSYLATED A1C: CPT

## 2025-06-20 PROCEDURE — 80053 COMPREHEN METABOLIC PANEL: CPT

## 2025-06-20 PROCEDURE — 36415 COLL VENOUS BLD VENIPUNCTURE: CPT

## 2025-06-22 LAB — THYROGLOB AB SERPL-ACNC: <1.5 IU/ML (ref 0–4)

## 2025-07-07 SDOH — ECONOMIC STABILITY: TRANSPORTATION INSECURITY
IN THE PAST 12 MONTHS, HAS THE LACK OF TRANSPORTATION KEPT YOU FROM MEDICAL APPOINTMENTS OR FROM GETTING MEDICATIONS?: NO

## 2025-07-07 SDOH — ECONOMIC STABILITY: INCOME INSECURITY: HOW HARD IS IT FOR YOU TO PAY FOR THE VERY BASICS LIKE FOOD, HOUSING, MEDICAL CARE, AND HEATING?: NOT VERY HARD

## 2025-07-07 SDOH — ECONOMIC STABILITY: FOOD INSECURITY: WITHIN THE PAST 12 MONTHS, THE FOOD YOU BOUGHT JUST DIDN'T LAST AND YOU DIDN'T HAVE MONEY TO GET MORE.: NEVER TRUE

## 2025-07-07 SDOH — HEALTH STABILITY: PHYSICAL HEALTH: ON AVERAGE, HOW MANY DAYS PER WEEK DO YOU ENGAGE IN MODERATE TO STRENUOUS EXERCISE (LIKE A BRISK WALK)?: 3 DAYS

## 2025-07-07 SDOH — ECONOMIC STABILITY: TRANSPORTATION INSECURITY
IN THE PAST 12 MONTHS, HAS LACK OF RELIABLE TRANSPORTATION KEPT YOU FROM MEDICAL APPOINTMENTS, MEETINGS, WORK OR FROM GETTING THINGS NEEDED FOR DAILY LIVING?: NO

## 2025-07-07 SDOH — ECONOMIC STABILITY: HOUSING INSECURITY
IN THE LAST 12 MONTHS, WAS THERE A TIME WHEN YOU DID NOT HAVE A STEADY PLACE TO SLEEP OR SLEPT IN A SHELTER (INCLUDING NOW)?: NO

## 2025-07-07 SDOH — HEALTH STABILITY: PHYSICAL HEALTH: ON AVERAGE, HOW MANY MINUTES DO YOU ENGAGE IN EXERCISE AT THIS LEVEL?: 60 MIN

## 2025-07-07 SDOH — HEALTH STABILITY: MENTAL HEALTH
STRESS IS WHEN SOMEONE FEELS TENSE, NERVOUS, ANXIOUS, OR CAN'T SLEEP AT NIGHT BECAUSE THEIR MIND IS TROUBLED. HOW STRESSED ARE YOU?: NOT AT ALL

## 2025-07-07 SDOH — ECONOMIC STABILITY: INCOME INSECURITY: IN THE LAST 12 MONTHS, WAS THERE A TIME WHEN YOU WERE NOT ABLE TO PAY THE MORTGAGE OR RENT ON TIME?: NO

## 2025-07-07 SDOH — ECONOMIC STABILITY: FOOD INSECURITY: WITHIN THE PAST 12 MONTHS, YOU WORRIED THAT YOUR FOOD WOULD RUN OUT BEFORE YOU GOT MONEY TO BUY MORE.: NEVER TRUE

## 2025-07-07 SDOH — ECONOMIC STABILITY: TRANSPORTATION INSECURITY
IN THE PAST 12 MONTHS, HAS LACK OF TRANSPORTATION KEPT YOU FROM MEETINGS, WORK, OR FROM GETTING THINGS NEEDED FOR DAILY LIVING?: NO

## 2025-07-07 ASSESSMENT — SOCIAL DETERMINANTS OF HEALTH (SDOH)
IN A TYPICAL WEEK, HOW MANY TIMES DO YOU TALK ON THE PHONE WITH FAMILY, FRIENDS, OR NEIGHBORS?: MORE THAN THREE TIMES A WEEK
HOW OFTEN DO YOU GET TOGETHER WITH FRIENDS OR RELATIVES?: THREE TIMES A WEEK
WITHIN THE PAST 12 MONTHS, YOU WORRIED THAT YOUR FOOD WOULD RUN OUT BEFORE YOU GOT THE MONEY TO BUY MORE: NEVER TRUE
DO YOU BELONG TO ANY CLUBS OR ORGANIZATIONS SUCH AS CHURCH GROUPS UNIONS, FRATERNAL OR ATHLETIC GROUPS, OR SCHOOL GROUPS?: YES
IN THE PAST 12 MONTHS, HAS THE ELECTRIC, GAS, OIL, OR WATER COMPANY THREATENED TO SHUT OFF SERVICE IN YOUR HOME?: NO
DO YOU BELONG TO ANY CLUBS OR ORGANIZATIONS SUCH AS CHURCH GROUPS UNIONS, FRATERNAL OR ATHLETIC GROUPS, OR SCHOOL GROUPS?: YES
HOW HARD IS IT FOR YOU TO PAY FOR THE VERY BASICS LIKE FOOD, HOUSING, MEDICAL CARE, AND HEATING?: NOT VERY HARD
ARE YOU MARRIED, WIDOWED, DIVORCED, SEPARATED, NEVER MARRIED, OR LIVING WITH A PARTNER?: NEVER MARRIED
HOW MANY DRINKS CONTAINING ALCOHOL DO YOU HAVE ON A TYPICAL DAY WHEN YOU ARE DRINKING: 1 OR 2
ARE YOU MARRIED, WIDOWED, DIVORCED, SEPARATED, NEVER MARRIED, OR LIVING WITH A PARTNER?: NEVER MARRIED
HOW OFTEN DO YOU HAVE SIX OR MORE DRINKS ON ONE OCCASION: NEVER
HOW OFTEN DO YOU ATTEND CHURCH OR RELIGIOUS SERVICES?: MORE THAN 4 TIMES PER YEAR
HOW OFTEN DO YOU ATTEND CHURCH OR RELIGIOUS SERVICES?: MORE THAN 4 TIMES PER YEAR
HOW OFTEN DO YOU ATTENT MEETINGS OF THE CLUB OR ORGANIZATION YOU BELONG TO?: MORE THAN 4 TIMES PER YEAR
HOW OFTEN DO YOU HAVE A DRINK CONTAINING ALCOHOL: MONTHLY OR LESS
HOW OFTEN DO YOU GET TOGETHER WITH FRIENDS OR RELATIVES?: THREE TIMES A WEEK
IN A TYPICAL WEEK, HOW MANY TIMES DO YOU TALK ON THE PHONE WITH FAMILY, FRIENDS, OR NEIGHBORS?: MORE THAN THREE TIMES A WEEK
HOW OFTEN DO YOU ATTENT MEETINGS OF THE CLUB OR ORGANIZATION YOU BELONG TO?: MORE THAN 4 TIMES PER YEAR

## 2025-07-07 ASSESSMENT — LIFESTYLE VARIABLES
HOW OFTEN DO YOU HAVE SIX OR MORE DRINKS ON ONE OCCASION: NEVER
AUDIT-C TOTAL SCORE: 1
HOW OFTEN DO YOU HAVE A DRINK CONTAINING ALCOHOL: MONTHLY OR LESS
HOW MANY STANDARD DRINKS CONTAINING ALCOHOL DO YOU HAVE ON A TYPICAL DAY: 1 OR 2
SKIP TO QUESTIONS 9-10: 1

## 2025-07-08 ENCOUNTER — APPOINTMENT (OUTPATIENT)
Dept: MEDICAL GROUP | Age: 25
End: 2025-07-08
Payer: COMMERCIAL

## 2025-07-08 VITALS
HEIGHT: 60 IN | SYSTOLIC BLOOD PRESSURE: 102 MMHG | DIASTOLIC BLOOD PRESSURE: 70 MMHG | WEIGHT: 138.2 LBS | BODY MASS INDEX: 27.13 KG/M2 | HEART RATE: 88 BPM | TEMPERATURE: 98.5 F | OXYGEN SATURATION: 98 %

## 2025-07-08 DIAGNOSIS — F25.0 SCHIZOAFFECTIVE DISORDER, BIPOLAR TYPE (HCC): ICD-10-CM

## 2025-07-08 DIAGNOSIS — B00.2 ORAL HERPES SIMPLEX INFECTION: ICD-10-CM

## 2025-07-08 DIAGNOSIS — Z11.59 NEED FOR HEPATITIS C SCREENING TEST: ICD-10-CM

## 2025-07-08 DIAGNOSIS — Z11.4 SCREENING FOR HIV (HUMAN IMMUNODEFICIENCY VIRUS): ICD-10-CM

## 2025-07-08 DIAGNOSIS — R73.03 PREDIABETES: ICD-10-CM

## 2025-07-08 DIAGNOSIS — R76.8 ANTI-TPO ANTIBODIES PRESENT: ICD-10-CM

## 2025-07-08 DIAGNOSIS — Z23 NEED FOR VACCINATION: Primary | ICD-10-CM

## 2025-07-08 PROCEDURE — 90651 9VHPV VACCINE 2/3 DOSE IM: CPT | Performed by: STUDENT IN AN ORGANIZED HEALTH CARE EDUCATION/TRAINING PROGRAM

## 2025-07-08 PROCEDURE — 90471 IMMUNIZATION ADMIN: CPT | Performed by: STUDENT IN AN ORGANIZED HEALTH CARE EDUCATION/TRAINING PROGRAM

## 2025-07-08 PROCEDURE — 3078F DIAST BP <80 MM HG: CPT | Performed by: STUDENT IN AN ORGANIZED HEALTH CARE EDUCATION/TRAINING PROGRAM

## 2025-07-08 PROCEDURE — 90715 TDAP VACCINE 7 YRS/> IM: CPT | Performed by: STUDENT IN AN ORGANIZED HEALTH CARE EDUCATION/TRAINING PROGRAM

## 2025-07-08 PROCEDURE — 3074F SYST BP LT 130 MM HG: CPT | Performed by: STUDENT IN AN ORGANIZED HEALTH CARE EDUCATION/TRAINING PROGRAM

## 2025-07-08 PROCEDURE — 90472 IMMUNIZATION ADMIN EACH ADD: CPT | Performed by: STUDENT IN AN ORGANIZED HEALTH CARE EDUCATION/TRAINING PROGRAM

## 2025-07-08 PROCEDURE — 99204 OFFICE O/P NEW MOD 45 MIN: CPT | Mod: 25 | Performed by: STUDENT IN AN ORGANIZED HEALTH CARE EDUCATION/TRAINING PROGRAM

## 2025-07-08 RX ORDER — VALACYCLOVIR HYDROCHLORIDE 1 G/1
1000 TABLET, FILM COATED ORAL 2 TIMES DAILY
Qty: 20 TABLET | Refills: 1 | Status: SHIPPED | OUTPATIENT
Start: 2025-07-08

## 2025-07-08 ASSESSMENT — PATIENT HEALTH QUESTIONNAIRE - PHQ9: CLINICAL INTERPRETATION OF PHQ2 SCORE: 0

## 2025-07-08 ASSESSMENT — FIBROSIS 4 INDEX: FIB4 SCORE: 0.38

## 2025-07-08 NOTE — PROGRESS NOTES
Verbal consent was acquired by the patient to use [employer] ambient listening note generation during this visit     Subjective:     HPI:   History of Present Illness  The patient is a 25-year-old female who presents to [facility].    She has been diagnosed with schizoaffective disorder and is under the care of Dr. Miriam Cooney at [facility], whom she sees every 3 months. She was initially diagnosed with schizoaffective disorder, but it has since been updated to schizophrenia. She also has bipolar disorder. She is currently on olanzapine and benztropine for tardive dyskinesia, which is effectively managed.    She has no known drug allergies. She has not received the HPV vaccine series and is interested in starting it. She does not work in healthcare and does not come into contact with human blood. She declines the pneumonia vaccine. Her last Pap smear was conducted a year ago, and she has an appointment with her gynecologist today for a Pap smear and STD testing. She underwent STD testing in 05/2025. She had the chickenpox vaccine as a child.    She experienced a cold sore yesterday and is seeking medication for it. This is her first occurrence this year.    Her psychiatrist wanted to go over her blood work that she did in 06/2025. She was prediabetic when she last did her lab results in 12/2024. Her thyroid levels were slightly elevated in 12/2024 when she was prediabetic, but they have since decreased slightly. Her lymphocytes and neutrophils are high and low, respectively.    She is currently using an intrauterine device (IUD) for contraception. She is on metformin 850 mg twice daily with meals.    SOCIAL HISTORY  She vapes and used to use cocaine.    FAMILY HISTORY  She has a family history of schizophrenia. Her mother had a benign meningioma last year.    Health Maintenance: Completed  No n/v/d/c, fever, chills, sob, chest pain      Objective:     Exam:  /70   Pulse 88   Temp  36.9 °C (98.5 °F)   Ht 1.524 m (5')   Wt 62.7 kg (138 lb 3.2 oz)   LMP 06/07/2025   SpO2 98%   BMI 26.99 kg/m²  Body mass index is 26.99 kg/m².    Physical Exam  Gen: nad  HENT: ncat, EOMI  Resp: ctabl  Cardiac: rrr, no m  GI: nt/nd  Neuro: no focal deficits, cn 2-12 intact throughout, sensation to light touch intact throughout  Psych: appropriate mood and affect      Results  Labs   - Thyroid levels: 06/2025, Thyroid levels are within normal range.   - Complete Blood Count (CBC): 06/2025, Lymphocytes and neutrophils are slightly off normal range.    Assessment & Plan:     1. Need for vaccination  Tdap Vaccine =>8YO IM    9VHPV Vaccine 2-3 Dose (GARDASIL 9)    9VHPV Vaccine 2-3 Dose IM      2. Screening for HIV (human immunodeficiency virus)  HIV AG/AB COMBO ASSAY SCREENING      3. Need for hepatitis C screening test  HEP C VIRUS ANTIBODY      4. Anti-TPO antibodies present        5. Prediabetes        6. Oral herpes simplex infection  valacyclovir (VALTREX) 1 g Tab      7. Schizoaffective disorder, bipolar type (HCC)            Assessment & Plan  1. Schizoaffective disorder  - Currently on olanzapine and benztropine for tardive dyskinesia, which is effectively managed.  - Continues her current medication regimen.  - Psychiatrist Dr. Miriam Cooney monitors her labs every year.  - Next psychiatric lab visit scheduled for 12/2025.    2. Health Maintenance.  - HPV vaccine series to start today, with subsequent doses in 1 to 2 months and 6 months.  - Tetanus shot to be administered today.  - Blood work for hepatitis C and HIV testing to be done during next lab visit in 12/2025.  - Ensure gynecologist sends records of Pap smear and STD testing.    3. Oral herpes  - Prescription for valacyclovir 1 g twice daily for 7 to 10 days provided.  - Discontinue medication once symptoms improve, even if before the completion of the 7-day course.  - 20 tablets with one refill prescribed.    4. Prediabetes.  - Blood sugar  levels have returned to normal.  - Continues taking metformin 850 mg twice daily with meals.  - Prediabetes likely due to weight gain from antipsychotic medication.    5. Thyroid function.  - Thyroid function currently within normal limits, with very low amounts of antithyroid antibodies present.  - Monitor for any sudden changes in energy levels or other symptoms.  - continue routine monitoring            Return in about 6 months (around 1/8/2026) for annual visit.    Please note that this dictation was created using voice recognition software. I have made every reasonable attempt to correct obvious errors, but I expect that there are errors of grammar and possibly content that I did not discover before finalizing the note.

## 2025-08-08 ENCOUNTER — APPOINTMENT (OUTPATIENT)
Dept: MEDICAL GROUP | Age: 25
End: 2025-08-08
Payer: COMMERCIAL

## 2025-08-27 ENCOUNTER — NON-PROVIDER VISIT (OUTPATIENT)
Dept: MEDICAL GROUP | Age: 25
End: 2025-08-27
Payer: COMMERCIAL

## 2025-08-27 DIAGNOSIS — Z23 NEED FOR VACCINATION: ICD-10-CM
